# Patient Record
Sex: FEMALE | Race: WHITE | NOT HISPANIC OR LATINO | URBAN - NONMETROPOLITAN AREA
[De-identification: names, ages, dates, MRNs, and addresses within clinical notes are randomized per-mention and may not be internally consistent; named-entity substitution may affect disease eponyms.]

---

## 2017-05-03 PROBLEM — H52.13: Noted: 2017-05-03

## 2017-05-03 PROBLEM — H52.223: Noted: 2018-02-01

## 2019-03-14 ENCOUNTER — IMPORTED ENCOUNTER (OUTPATIENT)
Dept: URBAN - NONMETROPOLITAN AREA CLINIC 1 | Facility: CLINIC | Age: 44
End: 2019-03-14

## 2019-03-14 PROCEDURE — 92014 COMPRE OPH EXAM EST PT 1/>: CPT

## 2019-03-14 PROCEDURE — 92015 DETERMINE REFRACTIVE STATE: CPT

## 2019-03-14 PROCEDURE — 92310 CONTACT LENS FITTING OU: CPT

## 2019-03-14 NOTE — PATIENT DISCUSSION
Compound Myopic Astigmatism OU-  discussed findings w/patient-  new spectacle/CL Rx issued-  monitor yearly or prn; 's Notes: MR 3/14/2019DFE 3/14/2019
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
## 2020-05-19 ENCOUNTER — IMPORTED ENCOUNTER (OUTPATIENT)
Dept: URBAN - NONMETROPOLITAN AREA CLINIC 1 | Facility: CLINIC | Age: 45
End: 2020-05-19

## 2020-05-19 PROCEDURE — 92014 COMPRE OPH EXAM EST PT 1/>: CPT

## 2020-05-19 PROCEDURE — 92015 DETERMINE REFRACTIVE STATE: CPT

## 2020-05-19 PROCEDURE — 92310 CONTACT LENS FITTING OU: CPT

## 2020-05-19 NOTE — PATIENT DISCUSSION
Compound Myopic Astigmatism OU-  discussed findings w/patient-  new spectacle/CL Rx issued-  monitor yearly or prn; 's Notes: MR 5/19/2020DFE 5/19/2020

## 2021-01-12 NOTE — PATIENT DISCUSSION
COUNSELING FOR LENS EXTRACTION:  I have talked with the patient about my impressions, explained our treatment plan, and have answered all questions.  She will consult Dr Hayley Anna

## 2021-01-12 NOTE — PATIENT DISCUSSION
DRY EYE COUNSELING:  I have explained that dry eye syndrome may cause many ocular symptoms including irritation, burning, tearing, and blurry vision. Frequent high quality artificial tears will help relieve these symptoms. Xiidra samples given.

## 2022-04-09 ASSESSMENT — VISUAL ACUITY
OD_SC: 20/20
OS_SC: 20/25
OS_SC: 20/20
OU_SC: J1+
OD_SC: 20/20
OS_SC: 20/20
OU_SC: 20/20

## 2022-04-09 ASSESSMENT — TONOMETRY
OS_IOP_MMHG: 18
OS_IOP_MMHG: 18
OD_IOP_MMHG: 20
OD_IOP_MMHG: 20